# Patient Record
Sex: FEMALE | Race: WHITE
[De-identification: names, ages, dates, MRNs, and addresses within clinical notes are randomized per-mention and may not be internally consistent; named-entity substitution may affect disease eponyms.]

---

## 2017-08-07 ENCOUNTER — HOSPITAL ENCOUNTER (OUTPATIENT)
Dept: HOSPITAL 11 - JP.SDS | Age: 52
Discharge: HOME | End: 2017-08-07
Attending: SURGERY
Payer: COMMERCIAL

## 2017-08-07 VITALS — SYSTOLIC BLOOD PRESSURE: 139 MMHG | DIASTOLIC BLOOD PRESSURE: 87 MMHG

## 2017-08-07 DIAGNOSIS — K21.9: ICD-10-CM

## 2017-08-07 DIAGNOSIS — K44.9: ICD-10-CM

## 2017-08-07 DIAGNOSIS — Z12.11: Primary | ICD-10-CM

## 2017-08-07 DIAGNOSIS — K64.9: ICD-10-CM

## 2017-08-07 DIAGNOSIS — J45.909: ICD-10-CM

## 2017-08-07 DIAGNOSIS — K29.50: ICD-10-CM

## 2017-08-07 DIAGNOSIS — K31.1: ICD-10-CM

## 2017-08-07 PROCEDURE — 43239 EGD BIOPSY SINGLE/MULTIPLE: CPT

## 2017-08-07 PROCEDURE — 43245 EGD DILATE STRICTURE: CPT

## 2017-08-07 PROCEDURE — 87081 CULTURE SCREEN ONLY: CPT

## 2017-08-07 PROCEDURE — 45378 DIAGNOSTIC COLONOSCOPY: CPT

## 2017-08-07 PROCEDURE — 88305 TISSUE EXAM BY PATHOLOGIST: CPT

## 2017-08-12 NOTE — OR
DATE OF PROCEDURE:  08/07/2017

 

PREOPERATIVE DIAGNOSES:

1. Heartburn, cough, and bilious regurgitation with history gastric bezoar.

2. Indications for screening colonoscopy.

 

POSTOPERATIVE DIAGNOSES:

1. Small hiatal hernia associated with moderate active gastroesophageal reflux disease and

    possible Clancy's esophagus.

2. Antral gastritis.

3. Pyloric stenosis.

4. Normal colonoscopic examination.

 

OPERATIVE PROCEDURE:

1. Esophagogastroduodenoscopy with:.

    a.     Biopsy of esophagogastric junction for histologic evaluation.

    b.     Biopsies of antrum for CLOtest (20272).

    c.     Dilation of pyloric sphincter (06225).

2. Flexible colonoscopy.

 

ANESTHESIA:  IV sedation.

 

INDICATION FOR PROCEDURE:  A 52-year-old presenting with the above symptoms, presently is on

Nexium 40 mg a day.  Plan is to proceed with upper GI endoscopy with biopsies and/or

dilation as indicated along with colonoscopy with polypectomy and/or biopsies as indicated.

Potential risks of the procedure including, bleeding, and perforation were discussed, and

the patient wishes to proceed.

 

DETAILS OF PROCEDURE:  The patient was taken to the operating room and placed in a left

lateral decubitus position.  IV sedation was administered, after which the upper GI

endoscope was passed orally through the length of the esophagus into the stomach with

retroflexion view of the fundus and initially up to the pyloric sphincter.  The latter was

then noted to be quite narrowed and the scope was unable to be passed across this.  Given

this at that point, the gastrointestinal passer was placed across the pyloric sphincter and

inflated to 45-Czech size.  Upon deflation, the endoscope was able to be passed through the

pyloric sphincter.  There did not appear to be any functional outlet obstruction prior to

the dilation; however, the visualized portion of the duodenum was unremarkable as was the

pyloric sphincter following the dilation.  There was some mild redness in the antrum but

without erosions or ulcers.

 

At the esophagogastric junction, the patient had a small hiatal hernia.  There was a fairly

active gastroesophageal reflux disease with some upward extension of the gastroesophageal

junction mucosal lining consistent with possible Clancy's esophagus.  Multiple biopsies

were obtained from the esophagogastric junction and sent for histologic evaluation.

Biopsies obtained from the antrum and sent for CLOtest.  No bleeding from the biopsy sites

were seen and the procedure then concluded.

 

Attention was then taken to the colonoscopy.  Initial digital rectal exam was performed and

was unremarkable.  Colonoscope was then placed into the rectum with retroflexion revealing

uncomplicated hemorrhoidal columns.  The scope was eventually passed along the cecum.  The

prep was fair.  There was a moderate amount of liquid and some solid stool present obscuring

some portions of the mucosal surfaces.  Other than that, however, there were no

abnormalities noted.  The scope was gradually withdrawn and the above findings were

reconfirmed, and the procedure then concluded.

 

The patient apparently has had fairly strong medical management for the reflux disease,

which I suspect associated with cough as well as obviously heartburn and bilious

regurgitation.  It is notable that the bezoar was absent today, so the anti-bezoar diet has

been effective.  We will see the patient back next week for followup.  This patient may be

someone who could consider antireflux procedure given the ongoing symptoms, specifically the

pulmonary symptoms.

 

 

 

 

Barry Cuevas MD

DD:  08/12/2017 09:20:18

DT:  08/12/2017 11:53:54

Job #:  [id number]

## 2019-09-16 ENCOUNTER — HOSPITAL ENCOUNTER (OUTPATIENT)
Dept: HOSPITAL 11 - JP.SDS | Age: 54
Discharge: HOME | End: 2019-09-16
Attending: PODIATRIST
Payer: COMMERCIAL

## 2019-09-16 VITALS — HEART RATE: 63 BPM | DIASTOLIC BLOOD PRESSURE: 86 MMHG | SYSTOLIC BLOOD PRESSURE: 135 MMHG

## 2019-09-16 DIAGNOSIS — E66.9: ICD-10-CM

## 2019-09-16 DIAGNOSIS — Z79.899: ICD-10-CM

## 2019-09-16 DIAGNOSIS — J45.909: ICD-10-CM

## 2019-09-16 DIAGNOSIS — Z79.51: ICD-10-CM

## 2019-09-16 DIAGNOSIS — M20.22: Primary | ICD-10-CM

## 2019-09-16 DIAGNOSIS — K21.9: ICD-10-CM

## 2019-09-16 PROCEDURE — C1776 JOINT DEVICE (IMPLANTABLE): HCPCS

## 2019-09-16 PROCEDURE — 28291 CORRJ HALUX RIGDUS W/IMPLT: CPT

## 2019-09-16 PROCEDURE — C1713 ANCHOR/SCREW BN/BN,TIS/BN: HCPCS

## 2019-09-16 NOTE — OR
DATE OF PROCEDURE:  09/16/2019

 

SURGEON:  Andrew Massey DPM

 

ASSISTANT:  None.

 

PREOPERATIVE DIAGNOSIS:  Hallux rigidus of the left foot.

 

POSTOPERATIVE DIAGNOSIS:  Hallux rigidus of the left foot.

 

PROCEDURE:  Arthrodesis of the left first metatarsophalangeal joint.

 

ANESTHESIA:  Local with IV sedation.

 

HEMOSTASIS:  Obtained with an ankle tourniquet on the left ankle at 250 mmHg.

 

ESTIMATED BLOOD LOSS:  10 mL.

 

MATERIALS:  One Arthrex left-sided standard plate was used, one Arthrex 3.0 mm x 28 mm

cannulated partially threaded screw was used, four Arthrex 3.0 mm locking screws were used,

and one Arthrex 3.0 mm nonlocking screw was used.

 

INJECTABLES:  18 mL of a 1:1 mixture of lidocaine 2% plain, Marcaine 0.5% plain were

injected preoperatively.  At the end of the procedure, 10 mL of Marcaine 0.5% plain was

injected prior to applying the dressing.

 

PATHOLOGY:  None.

 

CONDITION:  Stable.

 

INDICATIONS FOR SURGERY:  Painful bunion with arthritic changes, left first

metatarsophalangeal joint.

 

DESCRIPTION OF PROCEDURE:  The patient was brought to the operating room and placed on the

operating table in supine position.  Following IV sedation, anesthesia was obtained with a

total of 18 mL of a 1:1 mixture of Marcaine 0.5% plain and lidocaine 2% plain.  The left

foot was then scrubbed, prepped, and draped in the usual aseptic manner, raised to 60

degrees for hemostasis and exsanguinated using Esmarch bandage.  Tourniquet was inflated.

Foot was lowered to table.  Skin incision was made on the dorsomedial aspect of left first

metatarsophalangeal joint.  Incisions were deepened through subcutaneous tissues with care

taken to identify and retract all vital neurovascular structures.  Following subcutaneous

dissection, a T-type incision was made at the joint capsule of left first

metatarsophalangeal joint.  The capsule was carefully dissected off the first metatarsal

head and the base of the proximal phalanx.  The sagittal saw was used to remove the medial

eminence off the first metatarsal head and also the eminence off the dorsal aspect of first

metatarsal head.  The Arthrex reamers were used to ream the first metatarsal head and the

base of the proximal phalanx down to healthy bleeding bone and then they were fenestrated

with 0.062 K-wire and then reduced into an anatomic position and temporarily fixated with K-

wire from the 3.0 mm cannulated screw set and also the pinballs were used to provisionally

fixate the plate.  Position of the toe was then checked both visually and fluoroscopically

and found to be anatomic.  So then the distal pinball was removed and the compression screw

was placed across the arthrodesis site.  Then, the remainder of the screws were placed into

the plate.  The position was once again checked both visually and fluoroscopically and found

to be anatomic with excellent bone-on-bone contact.  The incision was flushed out with

copious amounts of sterile saline.  The capsular closure was obtained with 3-0 Vicryl,

subcutaneous closure with 3-0 Vicryl, skin closure with 3-0 nylon in a horizontal mattress

configuration, and a stab incision for the compression screw was closed with 3-0 nylon as

well as in one simple interrupted stitch.  Postoperative injection was given.  Foot was

dressed with Xeroform, 4x4s, Kerlix, and Coban.  The patient was returned to recovery room

with vital signs stable and vascular status intact to both feet.  The patient was sent home

on Loraine 5/325 one tab p.o. q.4 to 6 hours p.r.n. pain and also Vistaril 25 mg one tab p.o.

q.8 hours p.r.n. pain.  The patient was told to return to clinic for followup in 1 week.

Keep dressings clean, dry, and intact.  Maintain strict nonweightbearing, ambulate with knee

scooter and/or crutches.  Return to clinic in 1 week with Dr. Massey.

 

 

 

 

Andrew Massey DPM

DD:  09/16/2019 13:41:54

DT:  09/16/2019 16:34:14

Job #:  [id number]